# Patient Record
Sex: MALE | Race: BLACK OR AFRICAN AMERICAN | NOT HISPANIC OR LATINO | Employment: OTHER | URBAN - METROPOLITAN AREA
[De-identification: names, ages, dates, MRNs, and addresses within clinical notes are randomized per-mention and may not be internally consistent; named-entity substitution may affect disease eponyms.]

---

## 2017-04-10 DIAGNOSIS — Z12.11 SCREEN FOR COLON CANCER: Primary | ICD-10-CM

## 2017-04-10 RX ORDER — POLYETHYLENE GLYCOL 3350, SODIUM SULFATE, SODIUM CHLORIDE, POTASSIUM CHLORIDE, SODIUM ASCORBATE, AND ASCORBIC ACID 7.5-2.691G
2000 KIT ORAL ONCE
Qty: 2000 ML | Refills: 0 | Status: SHIPPED | OUTPATIENT
Start: 2017-04-10 | End: 2017-04-10

## 2017-04-10 NOTE — TELEPHONE ENCOUNTER
----- Message from Dione Mcnally sent at 4/10/2017  9:36 AM CDT -----  Contact: [phone]  GI- Patient was seen on 11/02/2016. Patient would like to make an appointment for colonoscopy, Please call patient back at [phone].

## 2017-04-10 NOTE — TELEPHONE ENCOUNTER
Returned pts call to reschedule procedure. Instructions sent to pts p.o. Pox per his request. RX request sent to Dr. Bojorquez.

## 2017-04-10 NOTE — LETTER
[facility]  [address] [address]  Phone: [phone]  Fax: [phone]       MoviPrep Colonoscopy Prep Instructions    [facility] [place]   [address] [place] [address]  [phone]    PATIENT NAME: Patricia Parsons PROCEDURE DAY/TIME: 4/24/2017  Someone will call you the day prior to procedure to give you an arrival time    CLEAR LIQUID DIET (START THE DAY BEFORE PROCEDURE):  Clear Liquid Diet means any liquid from the list below that is not red or purple in color:   Gatorade, Toñito-Aid, Lemonade (Yellow ONLY)-Gatorade is the preferred liquid   Tea (no milk or dairy)   Carbonated beverages (soft drink), regular or diet   Apple juice, white grape juice, white cranberry juice   Jell-O (orange, lemon, or lime flavors ONLY)   Clear, fat-free, beef or chicken broth   Bouillon, clear consommé   Snowball, Popsicles (NOT red or purple)  * No Solid Food or Alcohol   ITEMS TO BE PURCHASED FOR PREP (MoviPrep requires a prescription):         MoviPrep for your local pharmacy   BOWEL PREP INSTRUCTIONS THE DAY BEFORE THE EXAM:  1. Drink only clear liquids (see the above diet) all day. Gatorade is the best liquid.   Drink an extra 8 ounces of clear liquid every hour from 2pm to 5pm.   2. At 5pm, empty one Pouch A and one Pouch B into the disposable container   provided then add water or Gatorade to the top line (fill line). Mix the solution to dissolve. You may wish to mix the solution earlier in the day to refrigerate prior to drinking but the solution must be used within 24 hours of preparation. The MoviPrep container is divided by 4 marks.   3. At 6pm, start drinking the solution. Drink 8 ounces of solution (down to the next   kyaw) every 15 minutes until the solution is finished. You should drink an extra 16 ounces of any clear liquid with the solution to help the prep work.   THE DAY OF THE EXAM:        1.  Take your morning medications, if any,  with a small sip of water.         2.  Beginning 6 hours before your procedure, empty one Pouch A and one Pouch B   into the disposable container then add water or Gatorade to the top line. Mix the solution and drink 8 ounces every 15 minutes until the solution is finished. Drink an extra 16 ounces of any clear liquid with the solution.              *If your procedure is scheduled for the early morning, you will need to get up in               the middle of the night to take this dose of preparation.         3.  Have nothing to eat or drink for 3 hours before the procedure.         3.  Bring someone to drive you home (you should not drive for 12 hrs after the exam)        4.  Report to Admitting, 1st floor hospital entrance 2 hours before procedure time.   Note: If you are diabetic, do not take insulin or oral medications the morning of the procedure. Take only a half dose of insulin the day before your procedure. Do not take your diabetic pills the day before your procedure

## 2017-04-22 ENCOUNTER — ANESTHESIA EVENT (OUTPATIENT)
Dept: ENDOSCOPY | Facility: HOSPITAL | Age: 72
End: 2017-04-22
Payer: MEDICARE

## 2017-04-23 NOTE — ANESTHESIA PREPROCEDURE EVALUATION
04/22/2017  Patricia Parsons is a 71 y.o., male for (re-scheduled) colonoscopy    PRIOR ANES  None in epic as of 2017-04-22    ANES-RELATED MED/SURG  Hep C, chronic  CA prostate    No past surgical history on file.    Review of patient's allergies  2014-04-22   Allergen Reactions    Aspirin      ANES-RELATED HOME Rx  none    Anesthesia Evaluation         Review of Systems  Anesthesia Hx:   Denies Personal Hx of Anesthesia complications.   Social:  Non-Smoker, No Alcohol Use    Hematology/Oncology:  Hematology Normal   Oncology Normal     EENT/Dental:   Edentulous upper  Some lower teeth, No loose  No nosebleeds     Cardiovascular:  Cardiovascular Normal     Hepatic/GI:   Liver Disease, Hepatitis (pt says that, though in chart he does not have it)    Musculoskeletal:  Musculoskeletal Normal    Neurological:  Neurology Normal    Endocrine:  Endocrine Normal      Wt Readings from Last 3 Encounters:   11/02/16 62.1 kg (136 lb 12.8 oz)     Temp Readings from Last 3 Encounters:   11/02/16 36.3 °C (97.3 °F) (Oral)     BP Readings from Last 3 Encounters:   11/02/16 128/62     Pulse Readings from Last 3 Encounters:   11/02/16 (!) 56       Physical Exam  General:  Well nourished    Airway/Jaw/Neck:  AIRWAY FINDINGS: Normal    Eyes/Ears/Nose:  EYES/EARS/NOSE FINDINGS: Normal   Dental:  Dental Findings:    Chest/Lungs:  Chest/Lungs Clear    Heart/Vascular:  Heart Findings: Normal Heart Murmur  Systolic  Systolic Heart Murmur Grade: Grade II        Mental Status:  Mental Status Findings: Normal    LABS as of 2017-04-22  none    Anesthesia Plan  Type of Anesthesia, risks & benefits discussed:  Anesthesia Type:  MAC  Patient's Preference:   Intra-op Monitoring Plan:   Intra-op Monitoring Plan Comments:   Post Op Pain Control Plan:   Post Op Pain Control Plan Comments:   Induction:   IV  Beta Blocker:         Informed  Consent: Patient understands risks and agrees with Anesthesia plan.  Questions answered. Anesthesia consent signed with patient.  ASA Score: 2     Day of Surgery Review of History & Physical:    H&P update referred to the provider.         Ready For Surgery From Anesthesia Perspective.

## 2017-04-24 ENCOUNTER — HOSPITAL ENCOUNTER (OUTPATIENT)
Facility: HOSPITAL | Age: 72
Discharge: HOME | End: 2017-04-24
Attending: INTERNAL MEDICINE | Admitting: INTERNAL MEDICINE
Payer: MEDICARE

## 2017-04-24 ENCOUNTER — SURGERY (OUTPATIENT)
Age: 72
End: 2017-04-24

## 2017-04-24 ENCOUNTER — ANESTHESIA (OUTPATIENT)
Dept: ENDOSCOPY | Facility: HOSPITAL | Age: 72
End: 2017-04-24
Payer: MEDICARE

## 2017-04-24 DIAGNOSIS — Z12.11 SCREENING FOR COLON CANCER: Primary | ICD-10-CM

## 2017-04-24 PROCEDURE — 25000003 PHARM REV CODE 250: Performed by: INTERNAL MEDICINE

## 2017-04-24 PROCEDURE — 37000008 HC ANESTHESIA 1ST 15 MINUTES: Performed by: INTERNAL MEDICINE

## 2017-04-24 PROCEDURE — 63600175 PHARM REV CODE 636 W HCPCS: Performed by: NURSE ANESTHETIST, CERTIFIED REGISTERED

## 2017-04-24 PROCEDURE — 37000009 HC ANESTHESIA EA ADD 15 MINS: Performed by: INTERNAL MEDICINE

## 2017-04-24 PROCEDURE — 25000003 PHARM REV CODE 250: Performed by: NURSE ANESTHETIST, CERTIFIED REGISTERED

## 2017-04-24 PROCEDURE — G0121 COLON CA SCRN NOT HI RSK IND: HCPCS | Performed by: INTERNAL MEDICINE

## 2017-04-24 RX ORDER — GLYCOPYRROLATE 0.2 MG/ML
INJECTION INTRAMUSCULAR; INTRAVENOUS
Status: DISCONTINUED | OUTPATIENT
Start: 2017-04-24 | End: 2017-04-24

## 2017-04-24 RX ORDER — LIDOCAINE HCL/PF 100 MG/5ML
SYRINGE (ML) INTRAVENOUS
Status: DISCONTINUED | OUTPATIENT
Start: 2017-04-24 | End: 2017-04-24

## 2017-04-24 RX ORDER — PROPOFOL 10 MG/ML
VIAL (ML) INTRAVENOUS CONTINUOUS PRN
Status: DISCONTINUED | OUTPATIENT
Start: 2017-04-24 | End: 2017-04-24

## 2017-04-24 RX ORDER — PROPOFOL 10 MG/ML
VIAL (ML) INTRAVENOUS
Status: DISCONTINUED | OUTPATIENT
Start: 2017-04-24 | End: 2017-04-24

## 2017-04-24 RX ORDER — SODIUM CHLORIDE 9 MG/ML
INJECTION, SOLUTION INTRAVENOUS CONTINUOUS
Status: DISCONTINUED | OUTPATIENT
Start: 2017-04-24 | End: 2017-04-24 | Stop reason: HOSPADM

## 2017-04-24 RX ADMIN — PROPOFOL 160 MCG/KG/MIN: 10 INJECTION, EMULSION INTRAVENOUS at 10:04

## 2017-04-24 RX ADMIN — GLYCOPYRROLATE 0.4 MG: 0.2 INJECTION, SOLUTION INTRAMUSCULAR; INTRAVENOUS at 10:04

## 2017-04-24 RX ADMIN — SODIUM CHLORIDE: 0.9 INJECTION, SOLUTION INTRAVENOUS at 09:04

## 2017-04-24 RX ADMIN — EPHEDRINE SULFATE 10 MG: 50 INJECTION, SOLUTION INTRAMUSCULAR; INTRAVENOUS; SUBCUTANEOUS at 10:04

## 2017-04-24 RX ADMIN — LIDOCAINE HYDROCHLORIDE 100 MG: 20 INJECTION, SOLUTION INTRAVENOUS at 10:04

## 2017-04-24 RX ADMIN — PROPOFOL 100 MG: 10 INJECTION, EMULSION INTRAVENOUS at 10:04

## 2017-04-24 RX ADMIN — SODIUM CHLORIDE: 0.9 INJECTION, SOLUTION INTRAVENOUS at 10:04

## 2017-04-24 NOTE — ANESTHESIA POSTPROCEDURE EVALUATION
"Anesthesia Post Evaluation    Patient: Lucero Parsons    Procedure(s) Performed: Procedure(s) (LRB):  COLONOSCOPY (N/A)    Final Anesthesia Type: MAC  Patient location during evaluation: GI PACU  Patient participation: Yes- Able to Participate  Level of consciousness: awake and alert  Post-procedure vital signs: reviewed and stable  Pain management: adequate  Airway patency: patent  PONV status at discharge: No PONV  Anesthetic complications: no      Cardiovascular status: blood pressure returned to baseline and hemodynamically stable  Respiratory status: unassisted, spontaneous ventilation and room air  Hydration status: euvolemic  Follow-up not needed.        Visit Vitals    /65    Pulse (!) 54    Temp 36.4 °C (97.6 °F)    Resp 18    Ht 5' 7" (1.702 m)    Wt 59.4 kg (131 lb)    SpO2 100%    BMI 20.52 kg/m2       Pain/Flaca Score: Pain Assessment Performed: Yes (4/24/2017  9:44 AM)  Presence of Pain: denies (4/24/2017  9:44 AM)      "

## 2017-04-24 NOTE — DISCHARGE INSTRUCTIONS
Discharge Summary/Instructions for after Colonoscopy with Biopsy/Polypectomy    Lucero Parsons  4/24/2017  Justyn Bojorquez MD    Restrictions on Activity:    - Do not drive car or operate machinery until the day after the procedure.  - The following day: return to full activity including work.  - For 3 days: No heavy lifting, straining or running.  - Diet: Eat and drink normally unless instructed otherwise.    Treatment for Common Side Effects:  - Mild abdominal pain and bloating or excessive gas: rest, eat lightly and use a heating pad.     Symptoms to watch for and report to your physician:  1. Severe abdominal pain.  2. Fever within 24 hours after a procedure.  3. A large amount of rectal bleeding. (A small amount of blood from the rectum is not serious, especially if hemorrhoids are present.)  4. Because air was put into your colon during the procedure, expelling large amount of air from your rectum is normal.  5. You may not have a bowel movement for 1-3 days because of the colonoscopy prep. This is normal.  6. Go directly to the emergency room if you notice any of the following:     Chills and/or fever over 101   Persistent vomiting   Severe abdominal pain, other than gas cramps   Severe chest pain   Black, tarry stools   Any bleeding - exceeding one tablespoon    If you have any questions or problems, please call your Physician:    Justyn Bojorquez MD      Lab Results: Contact Physician's Office      If a complication or emergency situation arises and you are unable to reach your Physician - GO TO THE EMERGENCY ROOM.

## 2017-04-24 NOTE — PLAN OF CARE
PT SLEEPING, EASILY AROUSED.  NO C/O PAIN, DISCOMFORT, OR N/V.  PT TO EKG MONITOR.  PT CARE ASSUMED.

## 2017-04-24 NOTE — IP AVS SNAPSHOT
[facility]  [address]  [place] [address]  Phone: [phone]           Patient Discharge Instructions   Our goal is to set you up for success. This packet includes information on your condition, medications, and your home care.  It will help you care for yourself to prevent having to return to the hospital.     Please ask your nurse if you have any questions.      There are many details to remember when preparing to leave the hospital. Here is what you will need to do:    1. Take your medicine. If you are prescribed medications, review your Medication List on the following pages. You may have new medications to  at the pharmacy and others that you'll need to stop taking. Review the instructions for how and when to take your medications. Talk with your doctor or nurses if you are unsure of what to do.     2. Go to your follow-up appointments. Specific follow-up information is listed in the following pages. Your may be contacted by a nurse or clinical provider about future appointments. Be sure we have all of the phone numbers to reach you. Please contact your provider's office if you are unable to make an appointment.     3. Watch for warning signs. Your doctor or nurse will give you detailed warning signs to watch for and when to call for assistance. These instructions may also include educational information about your condition. If you experience any of warning signs to your health, call your doctor.               ** Verify the list of medication(s) below is accurate and up to date. Carry this with you in case of emergency. If your medications have changed, please notify your healthcare provider.             Medication List      Notice     You have not been prescribed any medications.               Please bring to all follow up appointments:    1. A copy of your discharge instructions.  2. All medicines you are currently taking in their original bottles.  3. Identification and  insurance card.    Please arrive 15 minutes ahead of scheduled appointment time.    Please call 24 hours in advance if you must reschedule your appointment and/or time.            Discharge Instructions       Discharge Summary/Instructions for after Colonoscopy with Biopsy/Polypectomy    Lucero Parsons  4/24/2017  Justyn Bojorquez MD    Restrictions on Activity:    - Do not drive car or operate machinery until the day after the procedure.  - The following day: return to full activity including work.  - For 3 days: No heavy lifting, straining or running.  - Diet: Eat and drink normally unless instructed otherwise.    Treatment for Common Side Effects:  - Mild abdominal pain and bloating or excessive gas: rest, eat lightly and use a heating pad.     Symptoms to watch for and report to your physician:  1. Severe abdominal pain.  2. Fever within 24 hours after a procedure.  3. A large amount of rectal bleeding. (A small amount of blood from the rectum is not serious, especially if hemorrhoids are present.)  4. Because air was put into your colon during the procedure, expelling large amount of air from your rectum is normal.  5. You may not have a bowel movement for 1-3 days because of the colonoscopy prep. This is normal.  6. Go directly to the emergency room if you notice any of the following:     Chills and/or fever over 101   Persistent vomiting   Severe abdominal pain, other than gas cramps   Severe chest pain   Black, tarry stools   Any bleeding - exceeding one tablespoon    If you have any questions or problems, please call your Physician:    Justyn Bojorquez MD      Lab Results: Contact Physician's Office      If a complication or emergency situation arises and you are unable to reach your Physician - GO TO THE EMERGENCY ROOM.          Primary Diagnosis     Your primary diagnosis was:  Screen For Colon Cancer      Admission Information     Date & Time Provider Department [facility]    4/24/2017  8:53 AM Justyn LEES  "MD Maryellen [facility] [id number]      Care Providers     Provider Role Specialty Primary office phone    Justyn Bojorquez MD Attending Provider Gastroenterology [phone]    Justyn Bojorquez MD Surgeon  Gastroenterology [phone]      Your Vitals Were     BP Pulse Temp Resp Height Weight    111/56 (BP Location: Right arm, Patient Position: Lying, BP Method: Automatic) 88 97.6 °F (36.4 °C) 20 5' 7" (1.702 m) 59.4 kg (131 lb)    SpO2 BMI             100% 20.52 kg/m2         Recent Lab Values     No lab values to display.      Allergies as of 4/24/2017        Reactions    Aspirin       [facility]     [facility] Nurse Care Line - 24/7 Assistance  Unless otherwise directed by your provider, please contact [facility], our nurse care line that is available for 24/7 assistance.     Registered nurses in the [facility] provide clinical advisement, health education, appointment booking, and other advisory services.  Call for this free service at [phone].        Advance Directives     An advance directive is a document which, in the event you are no longer able to make decisions for yourself, tells your healthcare team what kind of treatment you do or do not want to receive, or who you would like to make those decisions for you.  If you do not currently have an advance directive, [facility] encourages you to create one.  For more information call:  [phone] ([phone]), [phone] ([phone]),  or log on to [email or web address].        Language Assistance Services     ATTENTION: Language assistance services are available, free of charge. Please call [phone].      ATENCIÓN: Si habla español, tiene a montemayor disposición servicios gratuitos de asistencia lingüística. Llame al [phone].     CHÚ Ý: N?u b?n nói Ti?ng Vi?t, có các d?ch v? h? tr? ngôn ng? mi?n phí dành cho b?n. G?i s? [phone].        [facility] Sign-Up     Activating your [facility] " account is as easy as 1-2-3!     1) Visit [email or web address], select Sign Up Now, enter this activation code and your date of birth, then select Next.  [email or web address]  Expires: 6/8/2017 11:19 AM      2) Create a username and password to use when you visit [facility] in the future and select a security question in case you lose your password and select Next.    3) Enter your e-mail address and click Sign Up!    Additional Information  If you have questions, please e-mail [email or web address] or call [phone] to talk to our [facility] staff. Remember, [facility] is NOT to be used for urgent needs. For medical emergencies, dial 911.          [facility] complies with applicable Federal civil rights laws and does not discriminate on the basis of race, color, national origin, age, disability, or sex.

## 2017-04-24 NOTE — TRANSFER OF CARE
"Anesthesia Transfer of Care Note    Patient: Lucero Parsons    Procedure(s) Performed: Procedure(s) (LRB):  COLONOSCOPY (N/A)    Patient location: GI    Anesthesia Type: MAC    Transport from OR: Transported from OR on room air with adequate spontaneous ventilation    Post pain: adequate analgesia    Post assessment: no apparent anesthetic complications and tolerated procedure well    Post vital signs: stable    Level of consciousness: awake, alert and oriented    Nausea/Vomiting: no nausea/vomiting    Complications: none          Last vitals:   Visit Vitals    /65    Pulse (!) 54    Temp 36.4 °C (97.6 °F)    Resp 18    Ht 5' 7" (1.702 m)    Wt 59.4 kg (131 lb)    SpO2 100%    BMI 20.52 kg/m2     "

## 2017-04-24 NOTE — H&P
[facility]    CC: screening colonoscopy     HPI 71 y.o. male with PMH prostate cancer (2003) here for evaluation of colonoscopy from [facility]. He reports weight loss of about 15 lbs in past 6 months, which he attributes to dental work and not being able to eat well.      He denies blood in stool. He has never had blood in stool with FOBT or FIT testing.    Past Medical History:   Diagnosis Date    Chronic hepatitis C without mention of hepatic coma     Malignant neoplasm of prostate     Personal history of noncompliance with medical treatment, presenting hazards to health        Review of Systems  General ROS: negative for chills, fever or weight loss  Cardiovascular ROS: no chest pain or dyspnea on exertion  Gastrointestinal ROS: no abdominal pain, change in bowel habits, or black/ bloody stools    Physical Examination  General appearance: alert, cooperative, no distress  HENT: Normocephalic, atraumatic, neck symmetrical, no nasal discharge   Lungs: clear to auscultation bilaterally, no dullness to percussion bilaterally  Heart: regular rate and rhythm without rub; no displacement of the PMI   Abdomen: soft, non-tender; bowel sounds normoactive; no organomegaly  Extremities: extremities symmetric; no clubbing, cyanosis, or edema  Neurologic: Alert and oriented X 3, normal strength, normal coordination and gait    Assessment:   71 y.o. male with PMH prostate cancer (2003) here for evaluation for screening colonoscopy     Plan:   Colonoscopy today.         Justyn Bojorquez MD   [address]   [place] [address] [phone]

## 2017-04-25 VITALS
OXYGEN SATURATION: 100 % | TEMPERATURE: 98 F | DIASTOLIC BLOOD PRESSURE: 56 MMHG | RESPIRATION RATE: 20 BRPM | HEART RATE: 88 BPM | WEIGHT: 131 LBS | BODY MASS INDEX: 20.56 KG/M2 | SYSTOLIC BLOOD PRESSURE: 111 MMHG | HEIGHT: 67 IN

## 2023-07-19 ENCOUNTER — PATIENT OUTREACH (OUTPATIENT)
Dept: ADMINISTRATIVE | Facility: CLINIC | Age: 78
End: 2023-07-19
Payer: MEDICARE

## 2023-07-19 ENCOUNTER — EXTERNAL HOSPITAL ADMISSION (OUTPATIENT)
Dept: ADMINISTRATIVE | Facility: CLINIC | Age: 78
End: 2023-07-19
Payer: MEDICARE

## 2023-07-19 NOTE — PROGRESS NOTES
C3 nurse spoke with Lucero Parsons for a TCC post hospital discharge follow up call. The patient does not have a scheduled HOSFU appointment with Dr. Gladys Terry (PCP) within 5-7 days post hospital discharge date 7/18/23. C3 nurse was unable to schedule HOSFU appointment in [facility]. Message sent to PCP staff requesting they contact patient and schedule follow up appointment.     Patient will be seeing Ernestina Rosario NP (neurosurgery) on 8/2/23, with xrays on 7/31/23.     Current medications noted are: gabapentin 300mg TID, percocet 7.5-325mg Q 6hr PRN, and Os-ran BID.

## 2023-08-11 ENCOUNTER — OFFICE VISIT (OUTPATIENT)
Dept: PRIMARY CARE CLINIC | Facility: CLINIC | Age: 78
End: 2023-08-11
Payer: MEDICARE

## 2023-08-11 VITALS
RESPIRATION RATE: 18 BRPM | BODY MASS INDEX: 18.82 KG/M2 | OXYGEN SATURATION: 99 % | DIASTOLIC BLOOD PRESSURE: 60 MMHG | HEART RATE: 56 BPM | TEMPERATURE: 98 F | WEIGHT: 119.94 LBS | HEIGHT: 67 IN | SYSTOLIC BLOOD PRESSURE: 110 MMHG

## 2023-08-11 DIAGNOSIS — Z00.00 PREVENTATIVE HEALTH CARE: Primary | ICD-10-CM

## 2023-08-11 DIAGNOSIS — M51.06 LUMBAR DISC HERNIATION WITH MYELOPATHY: ICD-10-CM

## 2023-08-11 PROCEDURE — 99397 PER PM REEVAL EST PAT 65+ YR: CPT | Mod: GZ,S$GLB,, | Performed by: INTERNAL MEDICINE

## 2023-08-11 PROCEDURE — 1100F PR PT FALLS ASSESS DOC 2+ FALLS/FALL W/INJURY/YR: ICD-10-PCS | Mod: CPTII,S$GLB,, | Performed by: INTERNAL MEDICINE

## 2023-08-11 PROCEDURE — 3074F SYST BP LT 130 MM HG: CPT | Mod: CPTII,S$GLB,, | Performed by: INTERNAL MEDICINE

## 2023-08-11 PROCEDURE — 1126F PR PAIN SEVERITY QUANTIFIED, NO PAIN PRESENT: ICD-10-PCS | Mod: CPTII,S$GLB,, | Performed by: INTERNAL MEDICINE

## 2023-08-11 PROCEDURE — 1159F MED LIST DOCD IN RCRD: CPT | Mod: CPTII,S$GLB,, | Performed by: INTERNAL MEDICINE

## 2023-08-11 PROCEDURE — 1159F PR MEDICATION LIST DOCUMENTED IN MEDICAL RECORD: ICD-10-PCS | Mod: CPTII,S$GLB,, | Performed by: INTERNAL MEDICINE

## 2023-08-11 PROCEDURE — 1126F AMNT PAIN NOTED NONE PRSNT: CPT | Mod: CPTII,S$GLB,, | Performed by: INTERNAL MEDICINE

## 2023-08-11 PROCEDURE — 3078F PR MOST RECENT DIASTOLIC BLOOD PRESSURE < 80 MM HG: ICD-10-PCS | Mod: CPTII,S$GLB,, | Performed by: INTERNAL MEDICINE

## 2023-08-11 PROCEDURE — G0009 PNEUMOCOCCAL CONJUGATE VACCINE 20-VALENT: ICD-10-PCS | Mod: S$GLB,,, | Performed by: INTERNAL MEDICINE

## 2023-08-11 PROCEDURE — 3288F FALL RISK ASSESSMENT DOCD: CPT | Mod: CPTII,S$GLB,, | Performed by: INTERNAL MEDICINE

## 2023-08-11 PROCEDURE — 1100F PTFALLS ASSESS-DOCD GE2>/YR: CPT | Mod: CPTII,S$GLB,, | Performed by: INTERNAL MEDICINE

## 2023-08-11 PROCEDURE — 99999 PR PBB SHADOW E&M-EST. PATIENT-LVL III: ICD-10-PCS | Mod: PBBFAC,,, | Performed by: INTERNAL MEDICINE

## 2023-08-11 PROCEDURE — 99397 PR PREVENTIVE VISIT,EST,65 & OVER: ICD-10-PCS | Mod: GZ,S$GLB,, | Performed by: INTERNAL MEDICINE

## 2023-08-11 PROCEDURE — 99999 PR PBB SHADOW E&M-EST. PATIENT-LVL III: CPT | Mod: PBBFAC,,, | Performed by: INTERNAL MEDICINE

## 2023-08-11 PROCEDURE — 3074F PR MOST RECENT SYSTOLIC BLOOD PRESSURE < 130 MM HG: ICD-10-PCS | Mod: CPTII,S$GLB,, | Performed by: INTERNAL MEDICINE

## 2023-08-11 PROCEDURE — 90677 PNEUMOCOCCAL CONJUGATE VACCINE 20-VALENT: ICD-10-PCS | Mod: S$GLB,,, | Performed by: INTERNAL MEDICINE

## 2023-08-11 PROCEDURE — 90677 PCV20 VACCINE IM: CPT | Mod: S$GLB,,, | Performed by: INTERNAL MEDICINE

## 2023-08-11 PROCEDURE — 3288F PR FALLS RISK ASSESSMENT DOCUMENTED: ICD-10-PCS | Mod: CPTII,S$GLB,, | Performed by: INTERNAL MEDICINE

## 2023-08-11 PROCEDURE — G0009 ADMIN PNEUMOCOCCAL VACCINE: HCPCS | Mod: S$GLB,,, | Performed by: INTERNAL MEDICINE

## 2023-08-11 PROCEDURE — 3078F DIAST BP <80 MM HG: CPT | Mod: CPTII,S$GLB,, | Performed by: INTERNAL MEDICINE

## 2023-08-11 NOTE — PROGRESS NOTES
[facility] Progress Note      Chief Complaint     Hospital Follow Up      Subjective:      History of Present Illness:  Lucero Parsons is a 77 y.o. male     S/p L3 disc hernation s/p L3 laminectomy and microdiscectomy on 7/17 at [facility] with f/u Dr. Neurosurgery, Dr. Sadia Mariscal, on 8/2/2023 now with normal strength and sensation.  Pt uses a cane but does not need it.   He c/o abrupt pain and numbness of LLE on 7/11/2023 and fell multiple times due to profound weakness.   No longer takes percocet for pain and takes gabapentin at night prn   I reviewed labs 7/15-7/18/2023 which were all WNL       Past Medical History:  Past Medical History:   Diagnosis Date    Malignant neoplasm of prostate     Personal history of noncompliance with medical treatment, presenting hazards to health        Past Surgical History:  Past Surgical History:   Procedure Laterality Date    COLONOSCOPY N/A 4/24/2017    Procedure: COLONOSCOPY;  Surgeon: Justyn Bojorquez MD;  Location: [facility];  Service: Endoscopy;  Laterality: N/A;    PROSTATECTOMY         Allergies:  Review of patient's allergies indicates:   Allergen Reactions    Aspirin        Home Medications:  No current outpatient medications on file.     No current facility-administered medications for this visit.        Family History:  No family history on file.    Social History:  Social History     Tobacco Use    Smoking status: Never   Substance Use Topics    Alcohol use: No    Drug use: No       Review of Systems:  Pertinent positives and negatives listed in HPI. All other systems are reviewed and are negative.    Health Maintaince :   Health Maintenance Topics with due status: Not Due       Topic Last Completion Date    Lipid Panel 07/15/2023    Influenza Vaccine Not Due           Eye: UTD  Dental: UTD    Immunizations:   Tdap: n/a.  Influenza: need.  Pneumovax: needs  Shingrex x 2: UTD   COVID: needs booster  Hepatitis C:   Cancer  "Screening:  Colonoscopy: UTD repeat in 10 years  - done 5393-7296  DEXA:  n/a  LDCT: n/a    The 10-year ASCVD risk score (Yolanda CERVANTES, et al., 2019) is: 9.4%    Values used to calculate the score:      Age: 77 years      Sex: Male      Is Non- : Yes      Diabetic: No      Tobacco smoker: No      Systolic Blood Pressure: 110 mmHg      Is BP treated: No      HDL Cholesterol: 86 mg/dL      Total Cholesterol: 182 mg/dL      Objective:   /60 (BP Location: Left arm, Patient Position: Sitting, BP Method: Small (Manual))   Pulse (!) 56   Temp 98 °F (36.7 °C) (Oral)   Resp 18   Ht 5' 7" (1.702 m)   Wt 54.4 kg (119 lb 14.9 oz)   SpO2 99%   BMI 18.78 kg/m²      Body mass index is 18.78 kg/m².       Physical Examination:  General: Alert and awake in no apparent distress  HEENT: Normocephalic and atraumatic; Tms WNL  Eyes:  PERRL; EOMi with anicteric sclera and clear conjunctivae  Mouth:  Oropharynx clear and without exudate; moist mucous membranes  Neck:   Cervical nodes not enlarged; supple; no bruits  Cardio:  Regular rate and rhythm with normal S1 and S2; no murmurs or rubs  Resp:  CTAB; respirations unlabored; no wheezes, crackles or rhonchi  Abdom: Soft, NTND with normoactive bowel sounds; negative HSM  Back:               Vertical healing incision L3-L4 without tenderness/discharge  Extrem: Warm and well-perfused with no clubbing, cyanosis or edema  Skin:  No rashes, lesions, or color changes  Pulses:  2+ and symmetric distally  Neuro:  AAOx3; cooperative and pleasant with no focal deficits    Laboratory:      Most Recent Data:  Lab Results   Component Value Date    WBC 11.7 (H) 07/18/2023    HGB 11.6 (L) 07/18/2023    HCT 35.1 (L) 07/18/2023    CHOL 182 07/15/2023    TRIG 35 07/15/2023    HDL 86 (H) 07/15/2023    TSH 1.55 07/15/2023    INR 1.0 07/15/2023    HGBA1C 5.6 07/15/2023              CBC:   WBC   Date Value Ref Range Status   07/18/2023 11.7 (H) 4.5 - 11.0 103/uL Final " "    Hemoglobin   Date Value Ref Range Status   07/18/2023 11.6 (L) 13.5 - 17.5 gm/dL Final     Hematocrit   Date Value Ref Range Status   07/18/2023 35.1 (L) 40.0 - 51.0 % Final     MCV   Date Value Ref Range Status   07/18/2023 79.8 (L) 80.0 - 100.0 fL Final     RDW   Date Value Ref Range Status   07/18/2023 14.3 11.5 - 14.5 % Final     BMP: No results found for: "NA", "K", "CL", "CO2", "BUN", "CREATININE", "GLU", "CALCIUM", "MG", "PHOS"  LFTs: No results found for: "PROT", "ALBUMIN", "BILITOT", "AST", "ALKPHOS", "ALT", "GGT"  Coags:   INR   Date Value Ref Range Status   07/15/2023 1.0 0.9 - 1.2 Final     FLP:      Lab Results   Component Value Date    CHOL 182 07/15/2023     Lab Results   Component Value Date    HDL 86 (H) 07/15/2023     Lab Results   Component Value Date    LDLCALC 89 07/15/2023     Lab Results   Component Value Date    TRIG 35 07/15/2023     Lab Results   Component Value Date    CHOLHDL 2.12 07/15/2023      DM:      Hemoglobin A1C   Date Value Ref Range Status   07/15/2023 5.6 4.7 - 5.6 % Final     LDL Calculated   Date Value Ref Range Status   07/15/2023 89 <130 mg/dL Final     Thyroid:   TSH   Date Value Ref Range Status   07/15/2023 1.55 0.50 - 5.00 uIU/mL Final     Anemia: No results found for: "IRON", "TIBC", "FERRITIN", "[id number]", "FOLATE"  Cardiac: No results found for: "TROPONINI", "CKTOTAL", "CKMB", "BNP"  Urinalysis: No results found for: "LABURIN", "COLORU", "PHUA", "CLARITYU", "SPECGRAV", "LABSPEC", "NITRITE", "PROTEINUR", "GLUCOSEU", "KETONESU", "UROBILINOGEN", "BILIRUBINUR", "BLOODU", "RBCU", "WBCUA"    Other Results:  EKG (my interpretation):     Radiology:  No image results found.          Assessment/Plan     Lucero Parsons is a 77 y.o. male with:  1. Preventative health care  Assessment & Plan:  Get prevnar 20 daily  Get COVID and flu shot     Orders:  -     (In Office Administered) Pneumococcal Conjugate Vaccine (20 Valent) (IM)    2. Lumbar disc herniation with " myelopathy  Assessment & Plan:  Take gabapentin qhs prn  Cont to walk and strengthen LLE   Use can prn                I spent a total of 19 minutes on the day of the visit.This includes face to face time and non-face to face time preparing to see the patient (eg, review of tests), obtaining and/or reviewing separately obtained history, documenting clinical information in the electronic or other health record, independently interpreting results and communicating results to the patient/family/caregiver, or care coordinator.   Code Status:     Gladys Terry MD

## 2023-08-16 ENCOUNTER — TELEPHONE (OUTPATIENT)
Dept: PRIMARY CARE CLINIC | Facility: CLINIC | Age: 78
End: 2023-08-16

## 2023-11-03 ENCOUNTER — EXTERNAL HOME HEALTH (OUTPATIENT)
Dept: HOME HEALTH SERVICES | Facility: HOSPITAL | Age: 78
End: 2023-11-03
Payer: MEDICARE

## 2023-11-13 PROBLEM — Z00.00 PREVENTATIVE HEALTH CARE: Status: RESOLVED | Noted: 2017-04-24 | Resolved: 2023-11-13

## 2024-07-06 DIAGNOSIS — N52.9 ERECTILE DYSFUNCTION, UNSPECIFIED ERECTILE DYSFUNCTION TYPE: Primary | ICD-10-CM

## 2024-08-21 ENCOUNTER — OFFICE VISIT (OUTPATIENT)
Dept: PRIMARY CARE CLINIC | Facility: CLINIC | Age: 79
End: 2024-08-21
Payer: MEDICARE

## 2024-08-21 VITALS
WEIGHT: 119.69 LBS | DIASTOLIC BLOOD PRESSURE: 74 MMHG | OXYGEN SATURATION: 99 % | HEIGHT: 67 IN | BODY MASS INDEX: 18.79 KG/M2 | SYSTOLIC BLOOD PRESSURE: 98 MMHG | HEART RATE: 73 BPM

## 2024-08-21 DIAGNOSIS — Z85.46 PERSONAL HISTORY OF PROSTATE CANCER: ICD-10-CM

## 2024-08-21 DIAGNOSIS — D64.9 ANEMIA, UNSPECIFIED TYPE: ICD-10-CM

## 2024-08-21 DIAGNOSIS — E55.9 VITAMIN D DEFICIENCY: ICD-10-CM

## 2024-08-21 DIAGNOSIS — Z00.00 PREVENTATIVE HEALTH CARE: Primary | ICD-10-CM

## 2024-08-21 DIAGNOSIS — R39.12 WEAK URINARY STREAM: ICD-10-CM

## 2024-08-21 PROBLEM — M51.06 LUMBAR DISC HERNIATION WITH MYELOPATHY: Status: RESOLVED | Noted: 2023-08-11 | Resolved: 2024-08-21

## 2024-08-21 PROCEDURE — 99397 PER PM REEVAL EST PAT 65+ YR: CPT | Mod: S$GLB,,, | Performed by: INTERNAL MEDICINE

## 2024-08-21 PROCEDURE — 3288F FALL RISK ASSESSMENT DOCD: CPT | Mod: CPTII,S$GLB,, | Performed by: INTERNAL MEDICINE

## 2024-08-21 PROCEDURE — 1101F PT FALLS ASSESS-DOCD LE1/YR: CPT | Mod: CPTII,S$GLB,, | Performed by: INTERNAL MEDICINE

## 2024-08-21 PROCEDURE — 99999 PR PBB SHADOW E&M-EST. PATIENT-LVL IV: CPT | Mod: PBBFAC,,, | Performed by: INTERNAL MEDICINE

## 2024-08-21 PROCEDURE — 1159F MED LIST DOCD IN RCRD: CPT | Mod: CPTII,S$GLB,, | Performed by: INTERNAL MEDICINE

## 2024-08-21 PROCEDURE — 3078F DIAST BP <80 MM HG: CPT | Mod: CPTII,S$GLB,, | Performed by: INTERNAL MEDICINE

## 2024-08-21 PROCEDURE — 1126F AMNT PAIN NOTED NONE PRSNT: CPT | Mod: CPTII,S$GLB,, | Performed by: INTERNAL MEDICINE

## 2024-08-21 PROCEDURE — 3074F SYST BP LT 130 MM HG: CPT | Mod: CPTII,S$GLB,, | Performed by: INTERNAL MEDICINE

## 2024-08-21 NOTE — PROGRESS NOTES
[facility] Progress Note      Chief Complaint     Annual Exam      Subjective:      History of Present Illness:  Lucero Parsons is a 78 y.o. male     Takes MVI for men daily; still cuts hair; walks with his pitbull every morning; [place]   [place]ited son in [place]; daughter lives in [facility]    Personal hx prostate cancer s/p prostatectomy approx 20 years ago: needs referral to  and check PSA    Weak stream with urinary retention, dribbling: no blood in urine or pain; sister stomach cancer     Tobacco - stopped in ; denies breathing issues     PSH: S/p L3 disc hernation s/p L3 laminectomy and microdiscectomy on  at [facility] with  Neurosurgery, Dr. Sadia Mariscal  -completely recovered without sensorimotor deficit and does not use ambulatory assistance    Anemia: Hb 11 in 2023 after surgery     Pt does have hx Hep C but viral RNA negative when checked in the past.  FH: has 4 living bro and 4 sisters alive; 83 y/o sister  sarcoidosis; bro  Hep C in   SH: no tobacco, drugs, alcohol; lives in [place] [facility]; PT tan    Needs Shingrex, covid, flu   Past Medical History:  Past Medical History:   Diagnosis Date    Lumbar disc herniation with myelopathy 2023    Malignant neoplasm of prostate     Personal history of noncompliance with medical treatment, presenting hazards to health        Past Surgical History:  Past Surgical History:   Procedure Laterality Date    COLONOSCOPY N/A 2017    Procedure: COLONOSCOPY;  Surgeon: Justyn Bojorquez MD;  Location: [facility];  Service: Endoscopy;  Laterality: N/A;    PROSTATECTOMY         Allergies:  Review of patient's allergies indicates:   Allergen Reactions    Aspirin        Home Medications:  No current outpatient medications on file.     No current facility-administered medications for this visit.        Family History:  No family history on file.    Social History:  Social History     Tobacco Use    Smoking  "status: Never   Substance Use Topics    Alcohol use: No    Drug use: No       Review of Systems:  Pertinent positives and negatives listed in HPI. All other systems are reviewed and are negative.    Health Maintaince :   Health Maintenance Topics with due status: Not Due       Topic Last Completion Date    Lipid Panel 07/15/2023    Influenza Vaccine Not Due           Eye: 11/2023    Dental:   UTD dentures upper and lower     Immunizations:   Tdap: n/a.  Influenza: needs.  Pneumovax: UTD  Shingrex x 2: needs  COVID: needs  Hepatitis C:   Cancer Screening:    The 10-year ASCVD risk score (Yolanda CERVANTES, et al., 2019) is: 8%    Values used to calculate the score:      Age: 78 years      Sex: Male      Is Non- : Yes      Diabetic: No      Tobacco smoker: No      Systolic Blood Pressure: 98 mmHg      Is BP treated: No      HDL Cholesterol: 86 mg/dL      Total Cholesterol: 182 mg/dL      Objective:   BP 98/74 (BP Location: Left arm, Patient Position: Sitting, BP Method: Medium (Manual))   Pulse 73   Ht 5' 7" (1.702 m)   Wt 54.3 kg (119 lb 11.4 oz)   SpO2 99%   BMI 18.75 kg/m²      Body mass index is 18.75 kg/m².       Physical Examination:  General: Alert and awake in no apparent distress  HEENT: Normocephalic and atraumatic; Tms WNL  Eyes:  PERRL; EOMi with anicteric sclera and clear conjunctivae  Mouth:  Oropharynx clear and without exudate; moist mucous membranes; upper and lower dentures  Neck:   Cervical nodes not enlarged (No submental, submandibular, preauricular, posterior auricular or occipital adenopathy); supple; no bruits  Cardio:  Regular rate and rhythm with normal S1 and S2; no murmurs or rubs  Resp:  CTAB; respirations unlabored; no wheezes, crackles or rhonchi  Abdom: Soft, NTND with normoactive bowel sounds; negative HSM  Extrem: Warm and well-perfused with no clubbing, cyanosis or edema  Skin:  No rashes, lesions, or color changes  Pulses:  2+ and symmetric " distally  Neuro:  AAOx3; cooperative and pleasant with no focal deficits    Laboratory:      Most Recent Data:  Lab Results   Component Value Date    WBC 11.7 (H) 07/18/2023    HGB 11.6 (L) 07/18/2023    HCT 35.1 (L) 07/18/2023    CHOL 182 07/15/2023    TRIG 35 07/15/2023    HDL 86 (H) 07/15/2023    ALT 14 10/26/2022    AST 24 10/26/2022     10/26/2022    K 4.0 10/26/2022    BUN 13 10/26/2022    CO2 29 10/26/2022    TSH 1.55 07/15/2023    INR 1.0 07/15/2023    HGBA1C 5.6 07/15/2023              CBC:   WBC   Date Value Ref Range Status   07/18/2023 11.7 (H) 4.5 - 11.0 103/uL Final     Hemoglobin   Date Value Ref Range Status   07/18/2023 11.6 (L) 13.5 - 17.5 gm/dL Final     Hematocrit   Date Value Ref Range Status   07/18/2023 35.1 (L) 40.0 - 51.0 % Final     MCV   Date Value Ref Range Status   07/18/2023 79.8 (L) 80.0 - 100.0 fL Final     RDW   Date Value Ref Range Status   07/18/2023 14.3 11.5 - 14.5 % Final     BMP:   Sodium   Date Value Ref Range Status   10/26/2022 140 135 - 146 mmol/L Final     Potassium   Date Value Ref Range Status   10/26/2022 4.0 3.5 - 5.3 mmol/L Final     Carbon Dioxide   Date Value Ref Range Status   10/26/2022 29 20 - 32 mmol/L Final     Blood Urea Nitrogen   Date Value Ref Range Status   10/26/2022 13 7 - 25 mg/dL Final     Creatinine   Date Value Ref Range Status   10/26/2022 0.80 0.70 - 1.28 mg/dL Final     Glucose   Date Value Ref Range Status   10/26/2022 74 65 - 99 mg/dL Final     Comment:                  Fasting reference interval     Calcium   Date Value Ref Range Status   10/26/2022 9.5 8.6 - 10.3 mg/dL Final     LFTs:   Albumin Level   Date Value Ref Range Status   10/26/2022 4.6 3.6 - 5.1 g/dL Final     Total Bilirubin   Date Value Ref Range Status   10/26/2022 0.4 0.2 - 1.2 mg/dL Final     AST   Date Value Ref Range Status   10/26/2022 24 10 - 35 U/L Final     Alkaline Phosphatase   Date Value Ref Range Status   10/26/2022 84 35 - 144 U/L Final     ALT   Date Value  "Ref Range Status   10/26/2022 14 9 - 46 U/L Final     Coags:   INR   Date Value Ref Range Status   07/15/2023 1.0 0.9 - 1.2 Final     FLP:      Lab Results   Component Value Date    CHOL 182 07/15/2023     Lab Results   Component Value Date    HDL 86 (H) 07/15/2023     Lab Results   Component Value Date    LDLCALC 89 07/15/2023     Lab Results   Component Value Date    TRIG 35 07/15/2023     Lab Results   Component Value Date    CHOLHDL 2.12 07/15/2023      DM:      Hemoglobin A1C   Date Value Ref Range Status   07/15/2023 5.6 4.7 - 5.6 % Final     LDL Calculated   Date Value Ref Range Status   07/15/2023 89 <130 mg/dL Final     Creatinine   Date Value Ref Range Status   10/26/2022 0.80 0.70 - 1.28 mg/dL Final     Thyroid:   TSH   Date Value Ref Range Status   07/15/2023 1.55 0.50 - 5.00 uIU/mL Final     Anemia: No results found for: "IRON", "TIBC", "FERRITIN", "[id number]", "FOLATE"  Cardiac: No results found for: "TROPONINI", "CKTOTAL", "CKMB", "BNP"  Urinalysis: No results found for: "LABURIN", "COLORU", "PHUA", "CLARITYU", "SPECGRAV", "LABSPEC", "NITRITE", "PROTEINUR", "GLUCOSEU", "KETONESU", "UROBILINOGEN", "BILIRUBINUR", "BLOODU", "RBCU", "WBCUA"    Other Results:  EKG (my interpretation):     Radiology:  No image results found.          Assessment/Plan     Lucero Parsons is a 78 y.o. male with:  1. Preventative health care  Assessment & Plan:  Get COVID, FLU, Shingrex vaccines   Get labs at [facility] on Monday 8am  Refer to      Orders:  -     Urinalysis; Future; Expected date: 08/21/2024  -     Comprehensive Metabolic Panel; Future; Expected date: 08/21/2024  -     Hepatitis C Antibody; Future; Expected date: 08/21/2024    2. Weak urinary stream  Assessment & Plan:  Refer to  and check PSA    Orders:  -     Ambulatory referral/consult to Urology; Future; Expected date: 08/21/2024    3. Personal history of prostate cancer  Overview:  S/p prostatectomy approx 20 years ago     Assessment & " Plan:  Check PSA and refer to Urology     Orders:  -     PROSTATE SPECIFIC ANTIGEN, DIAGNOSTIC; Future; Expected date: 08/21/2024    4. Vitamin D deficiency  Assessment & Plan:  Check level today    Orders:  -     Vitamin D; Future; Expected date: 08/21/2024    5. Anemia, unspecified type  Assessment & Plan:  Check CBC and ferritin    Orders:  -     CBC Auto Differential; Future; Expected date: 08/21/2024  -     FERRITIN; Future; Expected date: 08/21/2024             I spent a total of 27 minutes on the day of the visit.This includes face to face time and non-face to face time preparing to see the patient (eg, review of tests), obtaining and/or reviewing separately obtained history, documenting clinical information in the electronic or other health record, independently interpreting results and communicating results to the patient/family/caregiver, or care coordinator.   Code Status:     Gladys Terry MD

## 2024-08-21 NOTE — ASSESSMENT & PLAN NOTE
Get COVID, FLU, Shingrex vaccines   Get labs at [facility] on Monday 8am  Refer to BRYAN

## 2024-08-21 NOTE — PATIENT INSTRUCTIONS
Get COVID, FLU, Shingrex vaccines   Get labs at [facility] on Monday 8am  at [address]

## 2024-08-26 ENCOUNTER — LAB VISIT (OUTPATIENT)
Dept: LAB | Facility: HOSPITAL | Age: 79
End: 2024-08-26
Attending: INTERNAL MEDICINE
Payer: MEDICARE

## 2024-08-26 DIAGNOSIS — Z00.00 PREVENTATIVE HEALTH CARE: ICD-10-CM

## 2024-08-26 LAB
BILIRUB UR QL STRIP: NEGATIVE
CLARITY UR REFRACT.AUTO: ABNORMAL
COLOR UR AUTO: YELLOW
GLUCOSE UR QL STRIP: NEGATIVE
HGB UR QL STRIP: NEGATIVE
KETONES UR QL STRIP: ABNORMAL
LEUKOCYTE ESTERASE UR QL STRIP: NEGATIVE
NITRITE UR QL STRIP: NEGATIVE
PH UR STRIP: 8 [PH] (ref 5–8)
PROT UR QL STRIP: ABNORMAL
SP GR UR STRIP: 1.02 (ref 1–1.03)
URN SPEC COLLECT METH UR: ABNORMAL

## 2024-08-26 PROCEDURE — 81003 URINALYSIS AUTO W/O SCOPE: CPT | Performed by: INTERNAL MEDICINE

## 2024-08-27 ENCOUNTER — TELEPHONE (OUTPATIENT)
Dept: PRIMARY CARE CLINIC | Facility: CLINIC | Age: 79
End: 2024-08-27
Payer: MEDICARE

## 2024-08-27 DIAGNOSIS — R76.8 HEPATITIS C ANTIBODY TEST POSITIVE: Primary | ICD-10-CM

## 2024-08-27 NOTE — TELEPHONE ENCOUNTER
----- Message from Gladys Terry MD sent at 8/27/2024  8:04 AM CDT -----  Regarding: labs  Please add Hep C PCR to existing labs - thanks!

## 2024-09-10 ENCOUNTER — OFFICE VISIT (OUTPATIENT)
Dept: UROLOGY | Facility: CLINIC | Age: 79
End: 2024-09-10
Payer: MEDICARE

## 2024-09-10 VITALS
DIASTOLIC BLOOD PRESSURE: 60 MMHG | HEIGHT: 67 IN | HEART RATE: 49 BPM | WEIGHT: 120.13 LBS | BODY MASS INDEX: 18.85 KG/M2 | SYSTOLIC BLOOD PRESSURE: 131 MMHG

## 2024-09-10 DIAGNOSIS — C61 PROSTATE CANCER: ICD-10-CM

## 2024-09-10 DIAGNOSIS — Z92.3 HISTORY OF EXTERNAL BEAM RADIATION THERAPY: ICD-10-CM

## 2024-09-10 DIAGNOSIS — N52.31 ERECTILE DYSFUNCTION AFTER RADICAL PROSTATECTOMY: ICD-10-CM

## 2024-09-10 DIAGNOSIS — Z90.79 HISTORY OF RADICAL PROSTATECTOMY: ICD-10-CM

## 2024-09-10 DIAGNOSIS — R97.21 RISING PSA FOLLOWING TREATMENT FOR MALIGNANT NEOPLASM OF PROSTATE: Primary | ICD-10-CM

## 2024-09-10 LAB
BILIRUB SERPL-MCNC: NORMAL MG/DL
BLOOD URINE, POC: NORMAL
CLARITY, POC UA: CLEAR
COLOR, POC UA: YELLOW
GLUCOSE UR QL STRIP: NORMAL
KETONES UR QL STRIP: NORMAL
LEUKOCYTE ESTERASE URINE, POC: NORMAL
NITRITE, POC UA: NORMAL
PH, POC UA: 5.5
POC RESIDUAL URINE VOLUME: 1 ML (ref 0–100)
PROTEIN, POC: NORMAL
SPECIFIC GRAVITY, POC UA: 1.02
UROBILINOGEN, POC UA: 0.2

## 2024-09-10 PROCEDURE — 1101F PT FALLS ASSESS-DOCD LE1/YR: CPT | Mod: CPTII,S$GLB,, | Performed by: NURSE PRACTITIONER

## 2024-09-10 PROCEDURE — 3288F FALL RISK ASSESSMENT DOCD: CPT | Mod: CPTII,S$GLB,, | Performed by: NURSE PRACTITIONER

## 2024-09-10 PROCEDURE — 81002 URINALYSIS NONAUTO W/O SCOPE: CPT | Mod: S$GLB,,, | Performed by: NURSE PRACTITIONER

## 2024-09-10 PROCEDURE — 3078F DIAST BP <80 MM HG: CPT | Mod: CPTII,S$GLB,, | Performed by: NURSE PRACTITIONER

## 2024-09-10 PROCEDURE — 1126F AMNT PAIN NOTED NONE PRSNT: CPT | Mod: CPTII,S$GLB,, | Performed by: NURSE PRACTITIONER

## 2024-09-10 PROCEDURE — 99999 PR PBB SHADOW E&M-EST. PATIENT-LVL III: CPT | Mod: PBBFAC,,, | Performed by: NURSE PRACTITIONER

## 2024-09-10 PROCEDURE — 51798 US URINE CAPACITY MEASURE: CPT | Mod: S$GLB,,, | Performed by: NURSE PRACTITIONER

## 2024-09-10 PROCEDURE — 3075F SYST BP GE 130 - 139MM HG: CPT | Mod: CPTII,S$GLB,, | Performed by: NURSE PRACTITIONER

## 2024-09-10 PROCEDURE — 99204 OFFICE O/P NEW MOD 45 MIN: CPT | Mod: S$GLB,,, | Performed by: NURSE PRACTITIONER

## 2024-09-10 PROCEDURE — 1159F MED LIST DOCD IN RCRD: CPT | Mod: CPTII,S$GLB,, | Performed by: NURSE PRACTITIONER

## 2024-09-10 NOTE — PROGRESS NOTES
CHIEF COMPLAINT:    Lucero Parsons is a 78 y.o. male presents today for Prostate Cancer.     HISTORY OF PRESENTING ILLINESS:    Lucero Parsons is a 78 y.o. male new to our Urology Clinic with a history of Prostate Cancer.   He states that he had a Radical Prostatectomy at [facility] in 2003. He then underwent Radiation therapy. He has not records of initial diagnosis, PSA and [place] scoring or staging.     Since his surgery  he reports no issues.   PSA's have been good.   Good bladder control. Sometimes he has hesitancy but overall empties bladder well.  Nocturia 1-2x; depends on PM fluids.     ED > 1 year. Failed oral agents.   Has heard of an injection and is interested.     I personally reviewed their recent medical records as well as their outside medical, surgical, family, & social history.     12/06/2022 he was seen at [facility] with Urology; AIMEE Rogers.   Urologic history:  Prostatectomy In 2005-- unknown grade or stage  Salvage Radaition therapy with ADT in 2006  ADT given from [id number] 2019-November  Initial Visit [facility]  ONC  PSA 0.1  [id number].      10/26/2022 PSA was 0.07 at [facility]        REVIEW OF SYSTEMS:  Review of Systems   Constitutional: Negative.  Negative for chills and fever.   Eyes:  Negative for double vision.   Respiratory:  Negative for cough and shortness of breath.    Cardiovascular:  Negative for chest pain and palpitations.   Gastrointestinal:  Negative for abdominal pain, constipation, diarrhea, nausea and vomiting.   Genitourinary: Negative.  Negative for dysuria, frequency, hematuria and urgency.        See HPI   Musculoskeletal:  Negative for falls.   Neurological:  Negative for dizziness and seizures.   Endo/Heme/Allergies:  Negative for polydipsia.         PATIENT HISTORY:    Past Medical History:   Diagnosis Date    Lumbar disc herniation with myelopathy 08/11/2023    Malignant neoplasm of prostate     Personal history of noncompliance with medical treatment, presenting  hazards to health        Past Surgical History:   Procedure Laterality Date    COLONOSCOPY N/A 4/24/2017    Procedure: COLONOSCOPY;  Surgeon: Justyn Bojorquez MD;  Location: [facility];  Service: Endoscopy;  Laterality: N/A;    PROSTATECTOMY         No family history on file.    Social History     Socioeconomic History    Marital status: Single   Tobacco Use    Smoking status: Never   Substance and Sexual Activity    Alcohol use: No    Drug use: No       Allergies:  Aspirin    Medications:  No current outpatient medications on file.    PHYSICAL EXAMINATION:  Physical Exam  Vitals and nursing note reviewed.   Constitutional:       General: He is awake.      Appearance: Normal appearance.   HENT:      Head: Normocephalic.      Right Ear: External ear normal.      Left Ear: External ear normal.      Nose: Nose normal.   Cardiovascular:      Rate and Rhythm: Normal rate.   Pulmonary:      Effort: Pulmonary effort is normal. No respiratory distress.   Abdominal:      Tenderness: There is no abdominal tenderness. There is no right CVA tenderness or left CVA tenderness.   Genitourinary:     Penis: Normal.       Testes: Normal.      Comments: Prostate is surgically absent    Musculoskeletal:         General: Normal range of motion.      Cervical back: Normal range of motion.   Skin:     General: Skin is warm and dry.   Neurological:      General: No focal deficit present.      Mental Status: He is alert and oriented to person, place, and time.   Psychiatric:         Mood and Affect: Mood normal.         Behavior: Behavior is cooperative.           LABS:      In office UA today was clear of active infection and blood.     The PVR in the office today done immediately after urination by the nurse was 1.          Lab Results   Component Value Date    PSADIAG 0.05 08/26/2024       Lab Results   Component Value Date    CREATININE 0.9 08/26/2024    EGFRNORACEVR >60.0 08/26/2024               IMPRESSION:    Encounter Diagnoses    Name Primary?    Rising PSA following treatment for malignant neoplasm of prostate Yes    Prostate cancer     History of radical prostatectomy     History of external beam radiation therapy     Erectile dysfunction after radical prostatectomy          Assessment:       1. Rising PSA following treatment for malignant neoplasm of prostate    2. Prostate cancer    3. History of radical prostatectomy    4. History of external beam radiation therapy    5. Erectile dysfunction after radical prostatectomy        Plan:         I spent 45 minutes with the patient of which more than half was spent in direct consultation with the patient in regards to our treatment and plan.  We addressed the PSA as well as other office findings during the visit. Reviewed the possible contributory factors.   Reviewed recent and past pertinent labs and imaging.   Recommendations for PCP follow up visit;   Reassurance with today's in office UA; emptying bladder well.   Reassurance with most recent PSA.  No concern with Prostate Cancer.     We discussed his ED and the contributory factors.  Follow up with PCP for underlying medical conditions causing ED.   We discussed the physiological as well as his psychological aspects that contribute to ED.  Reviewed management  Reviewed the 1st, 2nd, & 3rd line therapies for ED.  The benefits, expectations risks, se of the different therapies.  Encouraged to take on an empty stomach 30-60 minutes prior to interaction.   He was informed that for the YO we have rep that comes to clinic for him discuss.  If interested in ICI therapy, medication comes from a compound pharmacy and the 1st dose has to be done under medical supervision.  This is done due to high risk for priapism.  Educational materials given.  He going to think about it.  Recommended lifestyle modifications with a proper, healthy diet, good hydration but during the day. Reducing bladder irritants.   Benefits of regular exercise   RTC one year  with PSA; sooner for ED.

## 2024-09-10 NOTE — PATIENT INSTRUCTIONS
Lab Results   Component Value Date    PSADIAG 0.05 08/26/2024     Penile Self-Injection Procedure  Self-injection is a good option for many men with erectile dysfunction (ED). A tiny needle is used to inject medication into the penis. This helps your penis get hard and stay that way long enough for sex. And sex and orgasm will feel as good as always. You may be nervous about doing self-injection at first. But with practice, it will get easier. Your healthcare provider will show you how to do self-injection the first time. The simple steps are outlined on this sheet.  Preparing for Injection  Wash your hands well with soap and water.  Prepare the medication (if needed).  Sit or  a comfortable position in a warm, well-lit room. If you need to, sit or  front of a mirror.  Find an injection site on one side of your penis, in a place with no visible veins. (Dont inject into the top, bottom, or head of the penis.)  Clean the injection site with an alcohol swab. Grasp the head of your penis firmly with your thumb and forefinger (dont just pinch the skin). Stretch the penis so the skin on the shaft is taut.  Injecting the Medication  Rest your penis against your inner thigh and pull it gently toward your knee. Dont twist or rotate it. This way youll be sure to inject the medication into the spot you chose and cleaned before.  Hold the syringe between your thumb and fingers, like youre holding a pen. Rest your forearm on your thigh for support.  Insert the needle at a 90-degree angle (perpendicular) to the shaft. Do this quickly to reduce discomfort. (The needle should go in easily. If it doesnt, stop right away.)  Move your thumb to the plunger. Press down to inject the medication, counting to 5.  Remove the needle and dispose of it safely.     The injection site can be in any part of the shaded area.     Insert the needle straight into the penis.    Gaining an Erection  Apply pressure to the injection  site for a few minutes. This prevents swelling and bruising and helps spread the medication.   Stand up. This may help your erection develop. Foreplay often helps, too.  Your penis should become firm within 10-20 minutes. The erection will last long enough for sex, and maybe longer.  Call Your Doctor If You Have:   An erection that lasts longer than 3-4  hours  Bleeding or bruising  Severe pain  Scarring or curvature of the penis       © 6618-5109 [employer] [facility], [address] [place] [address], [place] [address]. All rights reserved. This information is not intended as a substitute for professional medical care. Always follow your healthcare professional's instructions.